# Patient Record
(demographics unavailable — no encounter records)

---

## 2025-06-04 NOTE — BEGINNING OF VISIT
[Patient] : patient [Formal Caregiver] : formal caregiver [FreeTextEntry1] : Nurse from Nursing home on speaker phone

## 2025-07-16 NOTE — OB HISTORY
[Total Preg ___] : : [unfilled] [AB Induced ___] : [unfilled] elective (s) [Menopause  Age ____] : menopause occurred at age [unfilled]

## 2025-07-16 NOTE — PHYSICAL EXAM
[Completely disabled. Cannot carry on any self care. Totally confined to bed or chair] : Status 4- Completely disabled. Cannot carry on any self care. Totally confined to bed or chair [Normal] : Thyroid: Normal, no thyromegaly

## 2025-07-16 NOTE — REVIEW OF SYSTEMS
[Neuropathy] : neuropathy [Abn Vag Bleeding] : abnormal vaginal bleeding [Incontinence] : incontinence [Muscle Weakness] : muscle weakness [Negative] : Musculoskeletal [Vaginal Discharge] : no vaginal discharge

## 2025-07-16 NOTE — ASSESSMENT
[FreeTextEntry1] : 56y  LMP 46y presents today for consultation regarding a 5cm endometrial mass and postmenopausal bleeding. Pt reports daily bleeding but not saturating pad/diaper. She declines exam today but is willing to undergo pelvic exam under anesthesia with pap smear and dilation and curettage. Pt has a healthcare proxy: brother in law Yuriy Oviedo ([phone]).   - Pt counselled on risks/benefits/alternatives for EUA/D&C but cannot sign form due to muscular atrophy (lacks  strength) and helath care proxy is not present - Pt will need medical clearance prior to procedure - Booking form submitted - Mammogram referral given - Pt refusing colonoscopy/GI referral - Patient aware of the problems, proposed management and alternatives. She wants to proceed with the surgery.  Seen and discussed with Dr. Goldberg and Dr. Connie Olivarez PGY4

## 2025-07-16 NOTE — HISTORY OF PRESENT ILLNESS
[FreeTextEntry1] : 56y  LMP 46y presents today for consultation regarding an endometrial mass and postmenopausal bleeding.  Pt reports that she had her last period around 10y ago and developed vaginal bleeding 5y ago.  She bleeds almost every day, usually spotting sometimes slightly more.  She wears diapers and has never saturated through. She endorses night sweats.  Denies change in stool caliber, unintended weight changes, changes in appetite, fatigue.  Pt has not seen a gynecologist or had a pap smear in many years.  She is unsure if she ever had a pap smear.  She had a mammogram 2 years ago and states they have all been normal.   Ca 125: 120 (25)  OB: TOP x2 D&C x2 GYN: PMB, endometrial mass, denies other history but has not been to a GYN in many years PMH: Schizoaffective disorder, bipolar disorder, anxiety, neuralgia, peripheral vascular disease, muscular dystrophy, seizure disorder (last 15y ago), opioid use disorder, migraine, gallstones, chronic kidney disease, chronic hepatitis C PSH: D&C x2 FH: father and sister with lung cancer, both smokers Med: methadone, clonazepam, duloxetine, lactulose, tylenol, gabapentin, valproid acid SH: former smoker, quit >10y ago, opioid use disorder. Pt lives in [facility] and [facility]. She is wheelchair bound at baseline.  HCM: Pap: unknown Mammogram: reports 2y ago was wnl Colonoscopy: never, pt refuses